# Patient Record
Sex: FEMALE | Race: WHITE
[De-identification: names, ages, dates, MRNs, and addresses within clinical notes are randomized per-mention and may not be internally consistent; named-entity substitution may affect disease eponyms.]

---

## 2017-11-27 ENCOUNTER — HOSPITAL ENCOUNTER (OUTPATIENT)
Dept: HOSPITAL 47 - RADMAMWWP | Age: 62
Discharge: HOME | End: 2017-11-27
Payer: COMMERCIAL

## 2017-11-27 DIAGNOSIS — M85.851: ICD-10-CM

## 2017-11-27 DIAGNOSIS — Z12.31: Primary | ICD-10-CM

## 2017-11-27 DIAGNOSIS — M85.852: ICD-10-CM

## 2017-11-27 PROCEDURE — 77080 DXA BONE DENSITY AXIAL: CPT

## 2017-11-27 NOTE — BD
EXAMINATION TYPE: MG DEXA axial skeleton.  

 

DATE OF EXAM: 2017

 

COMPARISON: 2014

 

CLINICAL HISTORY: 62-year-old female osteoporosis

 

Height:  61.2 IN

Weight:  155 LBS

 

FRAX RISK QUESTIONS:

Alcohol (3 or more units per day):  NO

Family History (Parent hip fracture):  NO

Glucocorticoids (More than 3mos):  NO

           (Ex: prednisone, prednisolone, methylprednisolone, dexamethasone, and hydrocortisone).    
     

History of Fracture in Adulthood: NO

Secondary Osteoporosis:

  1.  Type 1 Diabetes: NO

  2.  Hyperthyroidism: NO

  3.  Menopause before 45: NO AGE 49

  4.  Malnutrition: NO

  5.  Chronic liver disease: NO

Rheumatoid Arthritis: NO

Current Tobacco Use: NO

 

RISK FACTORS 

HISTORY OF: 

Active: YES

Postmenopausal woman: AGE 49

Take estrogen and/or progesterone medications: NOT NOW

How long: OFF AND ON AGE 16 - 29

 

MEDICATIONS: 

Thyroid Medications: YES 

Which medication: LEVOXYL

How Lon + YEARS

Additional Medications: VIT D, LEVOXYL, MOTRIN, NIACIN 

 

 

 

EXAM MEASUREMENTS: 

Bone mineral densitometry was performed using the [employer] System.

Bone mineral density as measured about the Lumbar spine is:  

----- L1-L4(G/cm2): 1.134

T Score Values are as follows:

----- L2: 0.2

----- L3: -0.9

----- L4: -0.3

----- L1-L4: -0.4

Bone mineral density has: Increased 2.6% since study of: 2014

 

Bone mineral density about the R hip (g/cm2): 0.815

Bone mineral density about the L hip (g/cm2): 0.832

T Score values are as follows:

-----R Neck: -1.6

-----L Neck: -1.5

-----R Total: -0.6

-----L Total: -1.1

Bone mineral density has: Decreased -0.8% since study of: 2014

 

 

IMPRESSION:

Osteopenia (T Score between -2.5 and -1 as noted by T score values

There is slightly increased risk of fracture and the patient may be considered 

for treatment. Re-Screen 2-5 years.

 

 

 

 

 

NOTE:  T-SCORE=SD OF THE YOUNG ADULT MEAN.

## 2017-11-29 NOTE — MM
Reason for exam: screening  (asymptomatic).

Last mammogram was performed 2 years and 3 months ago.



History:

Patient is postmenopausal and has history of other cancer at age 39.

Cyst aspiration of the left breast, 2004.

Took hormonal contraceptives for 12 years beginning at age 15.



Physical Findings:

A clinical breast exam by your physician is recommended on an annual basis and 

results should be correlated with mammographic findings.



MG Screening Mammo w CAD

Bilateral CC and MLO view(s) were taken.

Prior study comparison: September 4, 2015, bilateral MG screening mammo w CAD.  

April 30, 2014, bilateral digital screening mammo w/CAD.

The breast tissue is heterogeneously dense. This may lower the sensitivity of 

mammography.  No significant changes when compared with prior studies.





ASSESSMENT: Negative, BI-RAD 1



RECOMMENDATION:

Routine screening mammogram of both breasts in 1 year.

## 2019-12-05 ENCOUNTER — HOSPITAL ENCOUNTER (OUTPATIENT)
Dept: HOSPITAL 47 - RADNMMAIN | Age: 64
End: 2019-12-05
Attending: INTERNAL MEDICINE
Payer: COMMERCIAL

## 2019-12-05 DIAGNOSIS — I20.9: Primary | ICD-10-CM

## 2019-12-05 PROCEDURE — 93351 STRESS TTE COMPLETE: CPT

## 2019-12-05 NOTE — ECHOS
STRESS ECHOCARDIOGRAM



INDICATIONS:

Chest pain.



BASELINE HEART RATE:

69.



BASELINE. BLOOD PRESSURE:

132/74



MAXIMUM HEART RATE:

137



MAXIMUM BLOOD PRESSURE:

186/87



85% MPHR:

132



100% MPHR:

155



METS:

11.7



MAXIMUM STAGE REACHED:

4



TOTAL EXERCISE TIME:

10:01



CLINICAL INFORMATION:

Baseline EKG shows sinus rhythm, normal axis, normal intervals.  Patient exercised on

Guilherme protocol for a total of 10 minutes achieving 11 METS, 85% of predicted maximum

heart rate without chest pain. At peak exercise, occasional PVCs are noted.



Baseline echo shows normal left ventricular size. wall motion, systolic function.

Postexercise, there is normal hyperdynamic response of all segments of myocardium

noted.



CONCLUSION:

1. Excellent exercise tolerance.

2. Negative stress test by EKG criteria.

3. Negative stress echo.





MMODL / IJN: [id number] / Job#: [id number]

## 2020-01-09 ENCOUNTER — HOSPITAL ENCOUNTER (OUTPATIENT)
Dept: HOSPITAL 47 - RADMAMWWP | Age: 65
Discharge: HOME | End: 2020-01-09
Attending: INTERNAL MEDICINE
Payer: MEDICARE

## 2020-01-09 DIAGNOSIS — M85.80: ICD-10-CM

## 2020-01-09 DIAGNOSIS — Z12.31: Primary | ICD-10-CM

## 2020-01-09 PROCEDURE — 77080 DXA BONE DENSITY AXIAL: CPT

## 2020-01-09 PROCEDURE — 77063 BREAST TOMOSYNTHESIS BI: CPT

## 2020-01-09 PROCEDURE — 77067 SCR MAMMO BI INCL CAD: CPT

## 2020-01-09 NOTE — BD
EXAMINATION TYPE: Axial Bone Density

 

DATE OF EXAM: 1/9/2020

 

COMPARISON: 11.27.2017

 

CLINICAL HISTORY: 64 YR OLD FEMALE....ICD-10 CODE:     M81.0 KNOWN OSTEOPOROSIS

 

 

Height:  60.3

Weight:  152

 

FRAX RISK QUESTIONS:

NOTHING TO NOTE HERE

 

RISK FACTORS 

HISTORY OF: 

Active: YES

Postmenopausal woman: YES, AT 51 YRS OLD, NO HORMONES

Hyperparathyroidism: NO

Adrenal Insufficiency: NO

 

MEDICATIONS: 

Thyroid Medications:  YES, SYNTHROID FOR ABOUT 25 YRS

Additional Medications: VIT D, STATIN FOR CHOLESTEROL, XANAX, REFLUX MEDS    

Additional History: CHOLESTEROL, REFLUX

 

 

EXAM MEASUREMENTS: 

Bone mineral densitometry was performed using the [employer] System.

Bone mineral density as measured about the Lumbar spine is:  

----- L1-L4(G/cm2): 1.208

T Score Values are as follows:

----- L1:  0.0

----- L2:  0.7

----- L3:  -0.2

----- L4:  0.3

----- L1-L4:  0.2

Bone mineral density has: Increased 6.1% since study of: 11.27.2017

 

Bone mineral density about the R hip (g/cm2): 0.915

Bone mineral density about the L hip (g/cm2):  0.882

T Score values are as follows:

-----R Neck:  -1.7

-----L Neck:  -1.7

-----R Total:  -0.7

-----L Total:  -1.0

Bone mineral density has: Decreased -0.1% since study of: 11.27.2017

 

FRAX%s:   THERE IS A 9.4% CHANCE FOR A MAJOR OSTEOPOROTIC FX AND A 1.1% FOR HIP.....PROBABILITY FOR F
X IN 10 YRS TIME

 

IMPRESSION:

Osteopenia (T Score between -2.5 and -1).

 

There is slightly increased risk of fracture and the patient may be considered 

for treatment. 

 

Re-Screen 2-5 years.

 

 

 

 

 

NOTE:  T-SCORE=SD OF THE YOUNG ADULT MEAN.

## 2020-01-10 NOTE — MM
Reason for exam: screening  (asymptomatic).

Last mammogram was performed 2 years and 1 month ago.



History:

Patient is postmenopausal and has history of other cancer at age 39.

Cyst aspiration of the left breast, 2004.

Took hormonal contraceptives for 12 years beginning at age 15.



Physical Findings:

A clinical breast exam by your physician is recommended on an annual basis and 

results should be correlated with mammographic findings.



MG 3D Screening Mammo W/Cad

Bilateral CC and MLO view(s) were taken.

Prior study comparison: November 27, 2017, bilateral MG screening mammo w CAD.  

September 4, 2015, bilateral MG screening mammo w CAD.

There are scattered fibroglandular densities.  There are benign appearing round 

calcifications bilaterally. There is no discrete abnormality.





ASSESSMENT: Benign, BI-RAD 2



RECOMMENDATION:

Routine screening mammogram of both breasts in 1 year.

## 2020-01-28 ENCOUNTER — HOSPITAL ENCOUNTER (OUTPATIENT)
Dept: HOSPITAL 47 - WWCWWP | Age: 65
Discharge: HOME | End: 2020-01-28
Attending: OBSTETRICS & GYNECOLOGY
Payer: MEDICARE

## 2020-01-28 VITALS
RESPIRATION RATE: 18 BRPM | SYSTOLIC BLOOD PRESSURE: 130 MMHG | TEMPERATURE: 98.1 F | HEART RATE: 62 BPM | DIASTOLIC BLOOD PRESSURE: 89 MMHG

## 2020-01-28 DIAGNOSIS — Z53.9: Primary | ICD-10-CM

## 2020-01-28 NOTE — P.HPOB
History of Present Illness


H&P Date: 20


Chief Complaint: The patient is here for her routine gynecologic exam.


This is a 64-year-old  012 with an LMP of .  The patient is here to 

establish with this office.  Her last pelvic exam was about 2-3 years ago.  She 

is without gynecologic complaints and denies any postmenopausal bleeding.  She 

has occasional hot flashes still that are not bothersome.








Review of Systems


Weight has been stable. She denies respiratory, cardiac and G.I. problems.  She 

denies maltreatment or problems with falling.  : she denies any significant 

problems with urinary leakage.








Past Medical History


Past Medical History: Cancer, GERD/Reflux, Hyperlipidemia, Thyroid Disorder


Additional Past Medical History / Comment(s): Basal cell skin cancer.  

Osteopenia. PAST GYN HISTORY: She has no history of STDs.


History of Any Multi-Drug Resistant Organisms: None Reported


Past Surgical History: Breast Surgery, Uterine Ablation


Additional Past Surgical History / Comment(s): Partial thyroidectomy, iliac 

stent, removal of skin cancer, left breast cyst removal, and colonoscopy (last 

, next after 5yr).


Past Psychological History: Anxiety


Smoking Status: Former smoker


Past Alcohol Use History: Daily (1 per day)


Additional Past Alcohol Use History / Comment(s): Smoked between age 15 and 23.


Past Drug Use History: None Reported


Additional History: She has been  since  and is infrequently sexually

active due to her 's health issues.  She is a retired dental hygienist.





- Past Family History


  ** Mother


Family Medical History: Cancer


Additional Family Medical History / Comment(s): Colon cancer and melanoma skin 

cancer.  Maternal grandfather had colon cancer.





  ** Father


Family Medical History: Cancer, Hypertension


Additional Family Medical History / Comment(s): Lymphoma and leukemia.





Medications and Allergies


                                Home Medications











 Medication  Instructions  Recorded  Confirmed  Type


 


ALPRAZolam [Xanax] 1 tab PO DAILY PRN 20 History


 


Aspirin 81 mg PO DAILY 20 History


 


Atorvastatin [Lipitor] 10 mg PO HS 20 History


 


Ergocalciferol [Vitamin D2] 50,000 unit PO Q7D 20 History


 


Famotidine [Pepcid] 20 mg PO HS 20 History


 


Ibuprofen [Motrin] 600 mg PO Q8HR PRN 20 History


 


Levothyroxine Sodium [Synthroid] 125 mcg PO DAILY 20 History








                                    Allergies











Allergy/AdvReac Type Severity Reaction Status Date / Time


 


sulfur dioxide Allergy  Rash/Hives Unverified 20 13:30














Exam


                                   Vital Signs











  Temp Pulse Resp BP Pulse Ox


 


 20 13:32  98.1 F  62  18  130/89  97








                                Intake and Output











 20





 22:59 06:59 14:59


 


Other:   


 


  Weight   69.853 kg











Height 5 feet 1 inch, weight 154 pounds, BMI 29.1.





This is a well-developed well-nourished white female who is alert and oriented 

times 3 in no acute distress.


HEENT: Within normal limits.


NECK: Supple without mass or thyromegaly.


CHEST AND LUNGS: Clear to auscultation.


HEART: Regular rate and rhythm.


BREASTS: Are without mass or discharge.


AXILLARY EXAM: Negative for adenopathy.


BACK: Negative for CVA tenderness.


ABDOMEN: Soft, nontender, without palpable masses.


PELVIC EXAM: Normal external genitalia with mild to moderate atrophy. Cervix and

 vagina appear normal with mild atrophy. There is no unusual discharge.  There 

is no evidence of prolapse.  The uterus is midposition, nongravid size and 

nontender. There are no palpable adnexal masses or tenderness.


RECTAL EXAM: Rectovaginal exam is negative for mass or tenderness and is 

negative for occult blood.


EXTREMITIES: Nontender.





IMPRESSION: 


1.  64-year-old menopausal female with normal gynecologic exam.


2.  History of osteopenia.





PLAN: 


1.  Pap smear was performed.  We will try to obtain previous Pap smear records 

from Dr. Johnson's office.  If we can demonstrate she has had adequate screening 

we will consider discontinuing Pap smears.  She states she has never had 

cervical problems.


2.  Self breast awareness was discussed with the patient.


3.  Screening mammogram was recently done on 2020 and was benign.  This 

will be repeated in 1 year.


4.  Osteoporosis prevention was discussed.  I have stressed the importance of 

adequate calcium, vitamin D and regular exercise.  Recommended amounts of 

calcium and vitamin D were also discussed.  Bone density test done on 2020

 showed osteopenia of the hips.  I have recommended that she repeat this in 2-3 

years.


5.  She does get flu shots in the fall.


6. The patient was advised to return in 1-2 years for her well woman 

examination.

## 2020-02-11 NOTE — P.PN
Progress Note - Text


Progress Note Date: 02/11/20


OUTPATIENT FOLLOW-UP NOTE





TEST(S)/RESULTS: Pap smear from 01/28/2020 was negative.


METHOD OF NOTIFICATION: The patient was notified by phone.


PATIENT COMMENTS: 


DIAGNOSIS: If Pap smear


DISCUSSION: We are waiting on Pap smear reports from Dr. Johnson's office.  If we 

can demonstrate adequate screening with no cervical problems, we can consider 

discontinuing Pap smear testing


PLAN:  The patient was advised to return in 1-2 years for her well woman 

examination.

## 2021-01-20 ENCOUNTER — HOSPITAL ENCOUNTER (OUTPATIENT)
Dept: HOSPITAL 47 - RADUSWWP | Age: 66
Discharge: HOME | End: 2021-01-20
Attending: INTERNAL MEDICINE
Payer: MEDICARE

## 2021-01-20 DIAGNOSIS — N20.0: Primary | ICD-10-CM

## 2021-01-20 PROCEDURE — 76770 US EXAM ABDO BACK WALL COMP: CPT

## 2021-01-20 NOTE — US
EXAMINATION TYPE: US kidneys/renal and bladder

 

DATE OF EXAM: 1/20/2021

 

COMPARISON: NONE

 

CLINICAL HISTORY: N20.0 Calculus of kidney. Pain

 

EXAM MEASUREMENTS:

 

Right Kidney:  10.2 x 4.3 x 3.6 cm

Left Kidney: 10.4 x 5.4 x 4.2 cm

 

 

 

 

Right Kidney: No hydronephrosis or masses seen  

Left Kidney: Lobulated upper pole.  

Bladder: wnl

**Bilateral Jets seen: Yes

 

IMPRESSION:

 

1. Normal renal ultrasound

## 2021-04-21 ENCOUNTER — HOSPITAL ENCOUNTER (OUTPATIENT)
Dept: HOSPITAL 47 - WWCWWP | Age: 66
End: 2021-04-21
Attending: OBSTETRICS & GYNECOLOGY
Payer: MEDICARE

## 2021-04-21 VITALS
HEART RATE: 72 BPM | SYSTOLIC BLOOD PRESSURE: 125 MMHG | TEMPERATURE: 98 F | RESPIRATION RATE: 16 BRPM | DIASTOLIC BLOOD PRESSURE: 87 MMHG

## 2021-04-21 DIAGNOSIS — Z12.31: Primary | ICD-10-CM

## 2021-04-21 DIAGNOSIS — Z85.828: ICD-10-CM

## 2021-04-21 DIAGNOSIS — Z87.39: ICD-10-CM

## 2021-04-21 DIAGNOSIS — Z79.82: ICD-10-CM

## 2021-04-21 DIAGNOSIS — E78.5: ICD-10-CM

## 2021-04-21 DIAGNOSIS — F41.9: ICD-10-CM

## 2021-04-21 DIAGNOSIS — Z87.891: ICD-10-CM

## 2021-04-21 DIAGNOSIS — N81.6: ICD-10-CM

## 2021-04-21 PROCEDURE — 77067 SCR MAMMO BI INCL CAD: CPT

## 2021-04-21 PROCEDURE — 77063 BREAST TOMOSYNTHESIS BI: CPT

## 2021-04-21 NOTE — P.HPOB
History of Present Illness


H&P Date: 21


Chief Complaint: The patient is here for her routine gynecologic exam and ma

mmogram.


This is a 65-year-old  012 with an LMP of .  The patient noticed a 

slight bulge coming from the vaginal opening about 2 weeks ago.  She was 

wondering if it was her bladder that was bulging.  The bulge felt somewhat firm 

and came to the opening.  She has noticed a slightly slower's urinary stream 

compared to the past.  She is otherwise without complaints.





Review of Systems


The patient has gained 4 pounds over the last year.  She denies respiratory or 

cardiac problems.  GI: Occasional constipation and occasional gastroesophageal 

reflux symptoms.








Past Medical History


Past Medical History: Cancer, GERD/Reflux, Hyperlipidemia, Thyroid Disorder


Additional Past Medical History / Comment(s): Basal cell skin cancer.  

Osteopenia. PAST GYN HISTORY: She has no history of STDs.


History of Any Multi-Drug Resistant Organisms: None Reported


Past Surgical History: Breast Surgery, Uterine Ablation


Additional Past Surgical History / Comment(s): Partial thyroidectomy, iliac 

stent, removal of skin cancer, left breast cyst removal, and colonoscopy (last 

, next after 5yr).


Past Psychological History: Anxiety


Smoking Status: Former smoker


Past Alcohol Use History: Daily (1-2 daily)


Additional Past Alcohol Use History / Comment(s): Smoked between age 15 and 23.


Past Drug Use History: None Reported


Additional History: She has been  since .  She is a retired dental 

hygienist.  She now has 2 grandchildren who live out of state.





- Past Family History


  ** Mother


Family Medical History: Cancer


Additional Family Medical History / Comment(s): Colon cancer and melanoma skin 

cancer.  Maternal grandfather had colon cancer.





  ** Father


Family Medical History: Cancer, Hypertension


Additional Family Medical History / Comment(s): Lymphoma and leukemia.





Medications and Allergies


                                Home Medications











 Medication  Instructions  Recorded  Confirmed  Type


 


ALPRAZolam [Xanax] 1 tab PO DAILY PRN 20 History


 


Aspirin 81 mg PO DAILY 20 History


 


Ergocalciferol [Vitamin D2] 50,000 unit PO Q7D 20 History


 


Famotidine [Pepcid] 20 mg PO HS 20 History


 


Ibuprofen [Motrin] 600 mg PO Q8HR PRN 20 History


 


Levothyroxine Sodium [Synthroid] 125 mcg PO DAILY 20 History


 


Rosuvastatin [Crestor] 10 mg PO HS 21 History








                                    Allergies











Allergy/AdvReac Type Severity Reaction Status Date / Time


 


sulfur dioxide Allergy  Rash/Hives Unverified 21 09:27














Exam


                                   Vital Signs











  Temp Pulse Resp BP Pulse Ox


 


 21 09:30  98.0 F  72  16  125/87  98








                                Intake and Output











 21





 22:59 06:59 14:59


 


Other:   


 


  Weight   71.668 kg











Height 5 feet 1 inch, weight 158 pounds, BMI 29.9.





This is a well-developed well-nourished white female who is alert and oriented 

times 3 in no acute distress.


HEENT: Within normal limits.


NECK: Supple without mass or thyromegaly.


CHEST AND LUNGS: Clear to auscultation.


HEART: Regular rate and rhythm.


BREASTS: Are without mass or discharge.


AXILLARY EXAM: Negative for adenopathy.


BACK: Negative for CVA tenderness.


ABDOMEN: Soft, nontender, without palpable masses.


PELVIC EXAM: Normal external genitalia with mild atrophy. Cervix and vagina 

appear normal mild atrophy. There is no unusual discharge.  There is a grade 2 

rectocele noted.  There is no significant cystocele or uterine prolapse.  The 

uterus is midposition, nongravid size and nontender. There are no palpable 

adnexal masses or tenderness.


RECTAL EXAM: Rectovaginal exam is negative for mass or tenderness and is 

negative for occult blood.  Rectovaginal exam does confirm a small rectocele.


EXTREMITIES: Nontender.





IMPRESSION: 


1.  65-year-old menopausal female with grade 2 rectocele.


2.  History of osteopenia





PLAN: 


1.  Pap smears have been discontinued.


2.  Self breast awareness was discussed with the patient.


3.  Screening mammogram will be done today.


4.  We had a long discussion regarding her rectocele.  She states the day she 

noticed it, she was somewhat constipated.  I have advised to that she avoid 

holding her stool longer than necessary.  We will proceed with conservative 

management at this time.  She will call if she is having greater problems.  The 

ACOG FAQ handout on pelvic relaxation was given to the patient.  We have 

discussed the option of surgical correction if she is having greater problems.


5.  Osteoporosis prevention was discussed.  I have stressed the importance of 

adequate calcium, vitamin D and regular exercise.  Recommended amounts of 

calcium and vitamin D were also discussed.  We will plan on repeating the bone 

density test in 1-2 years.  Her last one was done in .


6. The patient was advised to return in 1-2 years for her well woman 

examination.

## 2021-04-22 NOTE — MM
Reason for exam: screening  (asymptomatic).

Last mammogram was performed 1 year and 3 months ago.



History:

Patient is postmenopausal and has history of other cancer at age 39.

Cyst aspiration of the left breast, 2004.

Took hormonal contraceptives for 12 years beginning at age 15.



Physical Findings:

A clinical breast exam by your physician is recommended on an annual basis and 

results should be correlated with mammographic findings.



MG 3D Screening Mammo W/Cad

Bilateral CC and MLO view(s) were taken.  XCCL view(s) were taken of the left 

breast.

Prior study comparison: January 9, 2020, bilateral MG 3d screening mammo w/cad.  

November 27, 2017, bilateral MG screening mammo w CAD.

There are scattered fibroglandular densities.  There are benign appearing round 

calcifications bilaterally. There is no discrete abnormality.





ASSESSMENT: Benign, BI-RAD 2



RECOMMENDATION:

Routine screening mammogram of both breasts in 1 year.

## 2022-05-03 ENCOUNTER — HOSPITAL ENCOUNTER (OUTPATIENT)
Dept: HOSPITAL 47 - WWCWWP | Age: 67
End: 2022-05-03
Attending: OBSTETRICS & GYNECOLOGY
Payer: MEDICARE

## 2022-05-03 VITALS
SYSTOLIC BLOOD PRESSURE: 125 MMHG | RESPIRATION RATE: 17 BRPM | TEMPERATURE: 97.9 F | HEART RATE: 71 BPM | DIASTOLIC BLOOD PRESSURE: 82 MMHG

## 2022-05-03 DIAGNOSIS — Z01.419: Primary | ICD-10-CM

## 2022-05-03 DIAGNOSIS — Z87.39: ICD-10-CM

## 2022-05-03 DIAGNOSIS — Z78.0: ICD-10-CM

## 2022-05-03 DIAGNOSIS — Z88.8: ICD-10-CM

## 2022-05-03 DIAGNOSIS — Z87.891: ICD-10-CM

## 2022-05-03 DIAGNOSIS — E78.5: ICD-10-CM

## 2022-05-03 DIAGNOSIS — F41.9: ICD-10-CM

## 2022-05-03 NOTE — P.HPOB
History of Present Illness


H&P Date: 22


Chief Complaint: The patient is here for her routine gynecologic exam.





This is a 66-year-old  012 with an LMP of .  The patient is without 

gynecologic complaints.  She denies any problems from her known rectocele.





Review of Systems





Weight has been stable over the past year.  Denies respiratory or cardiac 

problems.  GI: Occasional cramping when she eats beef.





Past Medical History


Past Medical History: Cancer, GERD/Reflux, Hyperlipidemia, Thyroid Disorder


Additional Past Medical History / Comment(s): Basal cell skin cancer.  

Osteopenia. PAST GYN HISTORY: She has no history of STDs.


History of Any Multi-Drug Resistant Organisms: None Reported


Past Surgical History: Breast Surgery, Uterine Ablation


Additional Past Surgical History / Comment(s): Partial thyroidectomy, iliac 

stent, removal of skin cancer, left breast cyst removal, and colonoscopy (last 

, next after 5yr).


Past Psychological History: Anxiety


Smoking Status: Former smoker


Past Alcohol Use History: Daily (0-2 drinks per day)


Additional Past Alcohol Use History / Comment(s): Smoked between age 15 and 23.


Past Drug Use History: Marijuana


Additional Drug Use History / Comment(s): Marijuana as a teenager only.  No 

other drug use.


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 

hygienist.  She has 3 grandchildren who live out of state.





- Past Family History


  ** Mother


Family Medical History: Cancer


Additional Family Medical History / Comment(s): Colon cancer and melanoma skin 

cancer.  Maternal grandfather had colon cancer.





  ** Father


Family Medical History: Cancer, Hypertension


Additional Family Medical History / Comment(s): Lymphoma and leukemia.





Medications and Allergies


                                Home Medications











 Medication  Instructions  Recorded  Confirmed  Type


 


ALPRAZolam [Xanax] 1 tab PO DAILY PRN 20 History


 


Aspirin 81 mg PO DAILY 20 History


 


Ergocalciferol [Vitamin D2] 50,000 unit PO Q7D 20 History


 


Famotidine [Pepcid] 20 mg PO HS 20 History


 


Ibuprofen [Motrin] 600 mg PO Q8HR PRN 20 History


 


Levothyroxine Sodium [Synthroid] 125 mcg PO DAILY 20 History


 


Rosuvastatin [Crestor] 10 mg PO HS 21 History


 


Fexofenadine HCl [Allegra Allergy] 60 mg PO DAILY 22 History


 


Ubidecarenone [Co Q-10] 20 mg PO DAILY 22 History








                                    Allergies











Allergy/AdvReac Type Severity Reaction Status Date / Time


 


sulfur dioxide Allergy  Rash/Hives Unverified 22 14:45














Exam


                                   Vital Signs











  Temp Pulse Resp BP Pulse Ox


 


 22 14:48  97.9 F  71  17  125/82  97








                                Intake and Output











 22





 06:59 14:59 22:59


 


Other:   


 


  Weight  70.76 kg 














Height 5 foot 1 inch, weight 156 pounds, BMI 29.5.





This is a well-developed well-nourished white female who is alert and oriented 

times 3 in no acute distress.


HEENT: Within normal limits.


NECK: Supple without mass or thyromegaly.


CHEST AND LUNGS: Clear to auscultation.


HEART: Regular rate and rhythm.


BREASTS: Are without mass or discharge.


AXILLARY EXAM: Negative for adenopathy.


BACK: Negative for CVA tenderness.


ABDOMEN: Soft, nontender, without palpable masses.


PELVIC EXAM: Normal external genitalia with mild to moderate atrophy. Cervix and

vagina appear normal with mild to moderate atrophy. There is no unusual 

discharge.  There is a grade 1-2 rectocele.  The uterus is midposition, 

nongravid size and nontender. There are no palpable adnexal masses or 

tenderness.


RECTAL EXAM: Rectovaginal exam is negative for mass or tenderness and is 

negative for occult blood.  Rectal exam confirms a small rectocele.


EXTREMITIES: Nontender.





IMPRESSION: 


1.  66-year-old menopausal female with stable grade 1-2 rectocele.


2.  History of osteopenia.





PLAN: 


1.  Pap smears have been discontinued.


2.  Self breast awareness was discussed with the patient.  We have also 

discussed symptoms associated with inflammatory breast cancer.


3.  Screening mammogram is scheduled for 2022.  The patient has an order 

slip for this from her PCP.


4.  Osteoporosis prevention was discussed.  I have stressed the importance of 

adequate calcium, vitamin D and regular exercise.  Recommended amounts of 

calcium and vitamin D were also discussed.  She has a bone density test is 

scheduled for 2022.  The order slip was given to the patient for this.


5.  She is scheduled for a colonoscopy on 2022.


6.  She has completed her Covid vaccination series and did receive a booster.


7. She was advised to return in one year for her annual well woman exam.

## 2022-05-31 ENCOUNTER — HOSPITAL ENCOUNTER (OUTPATIENT)
Dept: HOSPITAL 47 - RADMAMWWP | Age: 67
Discharge: HOME | End: 2022-05-31
Attending: INTERNAL MEDICINE
Payer: MEDICARE

## 2022-05-31 DIAGNOSIS — M85.89: ICD-10-CM

## 2022-05-31 DIAGNOSIS — Z12.31: Primary | ICD-10-CM

## 2022-05-31 PROCEDURE — 77067 SCR MAMMO BI INCL CAD: CPT

## 2022-05-31 PROCEDURE — 77063 BREAST TOMOSYNTHESIS BI: CPT

## 2022-05-31 PROCEDURE — 77080 DXA BONE DENSITY AXIAL: CPT

## 2022-05-31 NOTE — BD
EXAMINATION TYPE: Axial Bone Density

 

DATE OF EXAM: 5/31/2022

 

 

CLINICAL HISTORY: 66 years year old Female.  ICD-10 CODE: M81.0 AGE-RELATED OSTEOPOROSIS W/O CURRENT

 

Height:  5 FT

Weight:  152

 

FRAX RISK QUESTIONS:

Alcohol (3 or more units per day):  NO

Family History (Parent hip fracture):  NO

Glucocorticoids (More than 3mos):  NO

           (Ex: prednisone, prednisolone, methylprednisolone, dexamethasone, and hydrocortisone).    
     

History of Fracture in Adulthood: NO

Secondary Osteoporosis:

  1.  Type 1 Diabetes: NO

  2.  Hyperthyroidism: NO

  3.  Menopause before 45: NO

  4.  Malnutrition: NO

  5.  Chronic liver disease: NO

Rheumatoid Arthritis: NO

Current Tobacco Use: NO

 

RISK FACTORS 

HISTORY OF: 

Surgery to Spine/Hip(right/left)/Wrist (right/left): NO

Family History of Osteoporosis: NO

Active: YES

Diet low in dairy products/other sources of calcium:  NO

Postmenopausal woman: YES

Take estrogen and/or progesterone medications: NO

Lost more than 2 inches in height since high school: YES

Frequent falls: NO

Poor Health: GOOD

Hyperparathyroidism: NO

Adrenal Insufficiency: NO

 

MEDICATIONS: 

Thyroid Medications:  YES

Which medication: SYNTHROID

How Long: APPROX 30 YEARS

  

Additional Medications: SYNTHROID, STATIN, XANAX AS NEEDED, OMEPRAZOLE, ALLEGRA, ASPIRIN,  

 

 

Additional History:

 

 

EXAM MEASUREMENTS: 

Bone mineral densitometry was performed using the [employer] System.

Bone mineral density as measured about the Lumbar spine is:  

----- L1-L4(G/cm2): 1.160

T Score Values are as follows:

----- L1: -0.2

----- L2: 0.6

----- L3: -0.8

----- L4: -0.2

----- L1-L4: -0.2

Bone mineral density has: DECREASED  -4.3 % since study of: 2020

 

Bone mineral density about the R hip (g/cm2): 0.763

Bone mineral density about the L hip (g/cm2): 0.791

T Score values are as follows:

-----R Neck: -2.0

-----L Neck: -1.8

-----R Total: -0.8

-----L Total: -1.3

Bone mineral density has: DECREASED  -2.7 % since study of: 2020

 

 

FRAX%s: The graph provided illustrates a 11.1 % chance for a major osteoporotic fx and a 1.8 % chance
 for the hips probability for fx in 10 years time.

 

IMPRESSION:

Osteopenia (T Score between -2.5 and -1).

 

There is slightly increased risk of fracture and the patient may be considered 

for treatment. 

 

Re-Screen 2-5 years.

 

NOTE:  T-SCORE=SD OF THE YOUNG ADULT MEAN.

## 2022-06-01 NOTE — P.PN
Progress Note - Text


Progress Note Date: 06/01/22





OUTPATIENT FOLLOW-UP NOTE





TEST(S)/RESULTS: Bone density test done on 05/31/2022 showed osteopenia


METHOD OF NOTIFICATION: A message with this result was left on the patient's 

voicemail.


PATIENT COMMENTS: 


DIAGNOSIS: Osteopenia


DISCUSSION: In the message I explained that there was a slight decrease in bone 

density from her 2020 test.  I have stressed the importance of adequate calcium,

vitamin D, and regular exercise.


PLAN: Repeat bone density testing in 2 years.

## 2022-06-01 NOTE — MM
Reason for Exam: Screening  (asymptomatic). 

Last mammogram was performed 1 year(s) and 1 month(s) ago. 





Patient History: 

Menarche at age 12. First Full-Term Pregnancy at age 27. Postmenopausal. Other cancer, age 39.

Hormonal Contraceptives for 12 years from age 15 until age 30. 2004, Cyst Aspiration on the Left

side. 





Risk Values: 

Monika 5 year model risk: 1.9%.

NCI Lifetime model risk: 6.4%.





Prior Study Comparison: 

11/27/2017 Bilateral Screening Mammogram, [facility]. 1/9/2020 Bilateral Screening Mammogram, [facility].

4/21/2021 Bilateral Screening Mammogram, [facility]. 





Tissue Density: 

There are scattered fibroglandular densities.





Findings: 

Analyzed By CAD. 

There is no suspicious group of microcalcifications or new suspicious mass in either breast. 





Overall Assessment: Benign, BI-RAD 2





Management: 

Screening Mammogram of both breasts in 1 year.

A clinical breast exam by your physician is recommended on an annual basis and results should be

correlated with mammographic findings.



Electronically signed and approved by: Rod Aguilar D.O. Radiologis

## 2023-06-06 ENCOUNTER — HOSPITAL ENCOUNTER (OUTPATIENT)
Dept: HOSPITAL 47 - WWCWWP | Age: 68
End: 2023-06-06
Attending: OBSTETRICS & GYNECOLOGY
Payer: MEDICARE

## 2023-06-06 VITALS
HEART RATE: 63 BPM | SYSTOLIC BLOOD PRESSURE: 115 MMHG | TEMPERATURE: 97.8 F | RESPIRATION RATE: 16 BRPM | DIASTOLIC BLOOD PRESSURE: 76 MMHG

## 2023-06-06 DIAGNOSIS — Z87.891: ICD-10-CM

## 2023-06-06 DIAGNOSIS — Z85.9: ICD-10-CM

## 2023-06-06 DIAGNOSIS — E78.5: ICD-10-CM

## 2023-06-06 DIAGNOSIS — E07.9: ICD-10-CM

## 2023-06-06 DIAGNOSIS — K21.9: ICD-10-CM

## 2023-06-06 DIAGNOSIS — Z79.890: ICD-10-CM

## 2023-06-06 DIAGNOSIS — Z80.8: ICD-10-CM

## 2023-06-06 DIAGNOSIS — Z01.419: Primary | ICD-10-CM

## 2023-06-06 DIAGNOSIS — J45.909: ICD-10-CM

## 2023-06-06 DIAGNOSIS — Z78.0: ICD-10-CM

## 2023-06-06 DIAGNOSIS — Z88.2: ICD-10-CM

## 2023-06-06 PROCEDURE — 77067 SCR MAMMO BI INCL CAD: CPT

## 2023-06-06 PROCEDURE — 77063 BREAST TOMOSYNTHESIS BI: CPT

## 2023-06-06 NOTE — P.HPOB
History of Present Illness


H&P Date: 23


Chief Complaint: The patient is here for her routine gynecologic exam and ma

mmogram.





This is a 67-year-old  012 with an LMP of .  The patient is without 

gynecologic complaints.  She has a known rectocele, but this has not been 

causing any significant problems.





Review of Systems





The patient has lost 4 pounds over the last year.  She denies respiratory, 

cardiac, or G.I. problems.





Past Medical History


Past Medical History: Asthma, Cancer, GERD/Reflux, Hyperlipidemia, Thyroid 

Disorder


Additional Past Medical History / Comment(s): Basal cell skin cancer.  

GERD/exercise induced asthma. Osteopenia. PAST GYN HISTORY: She has no history 

of STDs.


History of Any Multi-Drug Resistant Organisms: None Reported


Past Surgical History: Breast Surgery, Uterine Ablation


Additional Past Surgical History / Comment(s): Partial thyroidectomy, iliac 

stent, removal of skin cancer, left breast cyst removal, and colonoscopy (last 

, next after 5yr).


Past Psychological History: Anxiety


Smoking Status: Former smoker


Past Alcohol Use History: Occasional (0-2 per day.)


Additional Past Alcohol Use History / Comment(s): Smoked between age 15 and 23.


Past Drug Use History: Marijuana


Additional Drug Use History / Comment(s): Marijuana as a teenager only.  No 

other drug use.


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 

hygienist.  She has 3 grandchildren who live out of state.





- Past Family History


  ** Mother


Family Medical History: Cancer


Additional Family Medical History / Comment(s): Colon cancer and melanoma skin 

cancer.  Maternal grandfather had colon cancer.





  ** Father


Family Medical History: Cancer, Hypertension


Additional Family Medical History / Comment(s): Lymphoma and leukemia.





Medications and Allergies


                                Home Medications











 Medication  Instructions  Recorded  Confirmed  Type


 


ALPRAZolam [Xanax] 1 tab PO DAILY PRN 20 History


 


Aspirin 81 mg PO DAILY 20 History


 


Ergocalciferol [Vitamin D2] 50,000 unit PO Q7D 20 History


 


Ibuprofen [Motrin] 600 mg PO Q8HR PRN 20 History


 


Levothyroxine Sodium [Synthroid] 125 mcg PO DAILY 20 History


 


Rosuvastatin [Crestor] 10 mg PO HS 21 History


 


Fexofenadine HCl [Allegra Allergy] 60 mg PO DAILY 22 History


 


[place]/D3/Mag11/Zinc//Walter/Bor 1 tab PO DAILY 23 History





[Caltrate 600+D Plus Tablet]    


 


Omeprazole 20 mg PO DAILY 23 History








                                    Allergies











Allergy/AdvReac Type Severity Reaction Status Date / Time


 


sulfur dioxide Allergy  Rash/Hives Unverified 23 10:47














Exam


                                   Vital Signs











  Temp Pulse Resp BP Pulse Ox


 


 23 10:50  97.8 F  63  16  115/76  97








                                Intake and Output











 23





 22:59 06:59 14:59


 


Other:   


 


  Weight   68.946 kg














Height 5 foot 1 inch, weight 152 pounds, BMI 28.7.





This is a well-developed well-nourished white female who is alert and oriented 

times 3 in no acute distress.


HEENT: Within normal limits.


NECK: Supple without mass or thyromegaly.


CHEST AND LUNGS: Clear to auscultation.


HEART: Regular rate and rhythm.


BREASTS: Are without mass or discharge.


AXILLARY EXAM: Negative for adenopathy.


BACK: Negative for CVA tenderness.


ABDOMEN: Soft, nontender, without palpable masses.


PELVIC EXAM: Normal external genitalia with mild to moderate atrophy. Cervix and

vagina appear normal mild atrophy. There is no unusual discharge.  There is a 

stable grade 2 rectocele.  The uterus is midposition, nongravid size and 

nontender. There are no palpable adnexal masses or tenderness.


RECTAL EXAM: Vaginal exam is negative for mass or tenderness and is negative for

occult blood.  There is also confirms a small rectocele.


EXTREMITIES: Nontender.





IMPRESSION: 


1.  67-year-old menopausal female with stable grade 2 rectocele which is not 

causing any significant problems.


2.  History of osteopenia.  Her last bone density test was done on 2022.





PLAN: 


1.  Pap smears have been discontinued.


2.  Self breast awareness was discussed with the patient.  We have also 

discussed symptoms associated with inflammatory breast cancer.


3.  Screening mammogram will be done today.


4.  Osteoporosis prevention was discussed.  I have stressed the importance of 

adequate calcium, vitamin D and regular exercise.  Recommended amounts of 

calcium and vitamin D were also discussed.  We will plan on repeating the bone 

density test in 1 year.


5.   She was advised to return in one year for her annual well woman exam.

## 2023-06-07 NOTE — MM
Reason for Exam: Screening  (asymptomatic). 

Last mammogram was performed 1 year(s) and 1 month(s) ago. 





Patient History: 

Menarche at age 12. First Full-Term Pregnancy at age 27. Postmenopausal. Patient has history of

breast feeding. Hormonal Contraceptives for 12 years from age 15 until age 30. 2004, Cyst Aspiration

on the Left side. 





Risk Values: 

Monika 5 year model risk: 1.9%.

NCI Lifetime model risk: 6.2%.





Prior Study Comparison: 

1/9/2020 Bilateral Screening Mammogram, [facility]. 4/21/2021 Bilateral Screening Mammogram, [facility]. 5/31/2022

Bilateral MG 3D screening mammo w/cad, [facility]. 





Tissue Density: 

The breast tissue is heterogeneously dense. This may lower the sensitivity of mammography.





Findings: 

Analyzed By CAD. 

There is no suspicious group of microcalcifications or new suspicious mass in either breast.

Benign-appearing round calcifications within both breasts. 





Overall Assessment: Benign, BI-RAD 2





Management: 

Screening Mammogram of both breasts in 1 year.

A clinical breast exam by your physician is recommended on an annual basis and results should be

correlated with mammographic findings.



Electronically signed and approved by: Rashad Bernal D.O.

## 2024-06-18 ENCOUNTER — HOSPITAL ENCOUNTER (OUTPATIENT)
Dept: HOSPITAL 47 - WWCWWP | Age: 69
End: 2024-06-18
Attending: OBSTETRICS & GYNECOLOGY
Payer: MEDICARE

## 2024-06-18 VITALS
DIASTOLIC BLOOD PRESSURE: 86 MMHG | RESPIRATION RATE: 17 BRPM | HEART RATE: 81 BPM | SYSTOLIC BLOOD PRESSURE: 119 MMHG | TEMPERATURE: 98.4 F

## 2024-06-18 DIAGNOSIS — M85.80: ICD-10-CM

## 2024-06-18 DIAGNOSIS — Z88.2: ICD-10-CM

## 2024-06-18 DIAGNOSIS — N81.6: ICD-10-CM

## 2024-06-18 DIAGNOSIS — Z87.891: ICD-10-CM

## 2024-06-18 DIAGNOSIS — Z78.0: ICD-10-CM

## 2024-06-18 DIAGNOSIS — Z12.31: Primary | ICD-10-CM

## 2024-06-18 PROCEDURE — 77080 DXA BONE DENSITY AXIAL: CPT

## 2024-06-18 PROCEDURE — 77063 BREAST TOMOSYNTHESIS BI: CPT

## 2024-06-18 PROCEDURE — 77067 SCR MAMMO BI INCL CAD: CPT

## 2024-06-18 NOTE — P.HPOB
History of Present Illness


H&P Date: 24


Chief Complaint: The patient is here for her routine gynecologic exam and ma

mmogram.





This is a 68-year-old -0-1-2 with an LMP of .  The patient is without 

gynecologic complaints.  She wonders if the small rectocele that she has causes 

issues with passing gas unexpectedly.  She is otherwise without gynecologic 

complaints.





Review of Systems





The patient's weight has been stable over the last year.  She denies 

respiratory, cardiac, or G.I. problems.





Past Medical History


Past Medical History: Asthma, Cancer, GERD/Reflux, Hyperlipidemia, Thyroid 

Disorder


Additional Past Medical History / Comment(s): Basal cell skin cancer.  

GERD/exercise induced asthma. Osteopenia. PAST GYN HISTORY: She has no history 

of STDs.


History of Any Multi-Drug Resistant Organisms: None Reported


Past Surgical History: Breast Surgery, Uterine Ablation


Additional Past Surgical History / Comment(s): Partial thyroidectomy, iliac 

stent, removal of skin cancer, left breast cyst removal, and colonoscopy (last 

, next after 5yr).


Past Psychological History: Anxiety


Smoking Status: Former smoker


Past Alcohol Use History: Occasional (0-1 drink per day.)


Additional Past Alcohol Use History / Comment(s): Smoked between age 15 and 23.


Past Drug Use History: Marijuana


Additional Drug Use History / Comment(s): Marijuana as a teenager only.  No 

other drug use.


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 

hygienist.  She has 3 grandchildren who live out of state.





- Past Family History


  ** Mother


Family Medical History: Cancer


Additional Family Medical History / Comment(s): Colon cancer and melanoma skin 

cancer.  Maternal grandfather had colon cancer.





  ** Father


Family Medical History: Cancer, Hypertension


Additional Family Medical History / Comment(s): Lymphoma and leukemia.





Medications and Allergies


                                Home Medications











 Medication  Instructions  Recorded  Confirmed  Type


 


ALPRAZolam [Xanax] 1 tab PO DAILY PRN 20 History


 


Aspirin 81 mg PO DAILY 20 History


 


Ergocalciferol [Vitamin D2] 50,000 unit PO Q7D 20 History


 


Ibuprofen [Motrin] 600 mg PO Q8HR PRN 20 History


 


Levothyroxine Sodium [Synthroid] 125 mcg PO DAILY 20 History


 


Rosuvastatin [Crestor] 10 mg PO HS 21 History


 


Omeprazole 20 mg PO DAILY 23 History


 


Calcium Carbonate [Tums] 2 tab PO DAILY 24 History


 


Cetirizine HCl [Zyrtec] 10 mg PO DAILY 24 History


 


Cholecalciferol (Vitamin D3) 1 tab PO DAILY 24 History





[Vitamin D3 (1250 Mcg = 50,000 Iu)]    


 


Ubidecarenone [Co Q-10] 1 cap PO DAILY 24 History








                                    Allergies











Allergy/AdvReac Type Severity Reaction Status Date / Time


 


sulfur dioxide Allergy  Rash/Hives Unverified 24 09:38














Exam


                                   Vital Signs











  Temp Pulse Resp BP Pulse Ox


 


 24 09:41  98.4 F  81  17  119/86  97








                                Intake and Output











 24





 22:59 06:59 14:59


 


Other:   


 


  Weight   68.946 kg














Height 5 feet 0 inches, weight 152 pounds, BMI 29.7.





This is a well-developed well-nourished white female who is alert and oriented 

times 3 in no acute distress.


HEENT: Within normal limits.


NECK: Supple without mass or thyromegaly.


CHEST AND LUNGS: Clear to auscultation.


HEART: Regular rate and rhythm.


BREASTS: Are without mass or discharge.


AXILLARY EXAM: Negative for adenopathy.


BACK: Negative for CVA tenderness.


ABDOMEN: Soft, nontender, without palpable masses.


PELVIC EXAM: Normal external genitalia with mild atrophy. Cervix and vagina 

appear normal with mild to moderate atrophy. There is no unusual discharge.  

There is a stable grade 1-2 rectocele.  There is no other significant prolapse 

noted.  The uterus is midposition, nongravid size and nontender. There are no 

palpable adnexal masses or tenderness.


RECTAL EXAM: Rectovaginal exam is negative for mass or tenderness and is 

negative for occult blood.


EXTREMITIES: Nontender.





IMPRESSION: 


1.  68-year-old menopausal female with stable grade 2 rectocele.


2.  History of osteopenia.





PLAN: 


1.  Pap smears have been discontinued.


2.  Self breast awareness was discussed with the patient.  We have also 

discussed symptoms associated with inflammatory breast cancer.


3.  Screening mammogram will be done today.


4.  Osteoporosis prevention was discussed.  I have stressed the importance of 

adequate calcium, vitamin D and regular exercise.  Recommended amounts of 

calcium and vitamin D were also discussed.  Bone density testing will be done 

today.


5.  She was advised to return in one year for her annual well woman exam.

## 2024-06-19 NOTE — MM
Reason for Exam: Screening  (asymptomatic). 

Last screening mammogram was performed 12 month(s) ago.





Patient History: 

Menarche at age 12. First Full-Term Pregnancy at age 27. Postmenopausal. Patient has history of

breast feeding. Hormonal Contraceptives for 12 years from age 15 until age 30. 2004, Cyst Aspiration

on the Left side. 





Risk Values: 

Monika 5 year model risk: 1.9%.

NCI Lifetime model risk: 5.9%.





Prior Study Comparison: 

4/21/2021 Bilateral Screening Mammogram, [facility]. 5/31/2022 Bilateral MG 3D screening mammo w/cad, [facility].

6/6/2023 Bilateral MG 3D screening mammo w/cad, [facility]. 





Tissue Density: 

The breasts are heterogeneously dense, which may obscure small masses.





Findings: 

Analyzed By CAD. 

Asymmetric density outer left CC view 4.2 cm from the nipple. Additional views are recommended. No

suspicious calcifications within either breast. 





Overall Assessment: Incomplete: need additional imaging evaluation, BI-RAD 0





Management: 

Diagnostic Mammogram of the left breast.

.



Patient should continue monthly self-breast exams.  A clinical breast exam by your physician is

recommended on an annual basis.

This exam should not preclude additional follow-up of suspicious palpable abnormalities.



Note on Monika scores and lifetime risk:

1. A Monika score greater than 3% is considered moderate risk. If this is the case, consider

specialist referral to assess eligibility for a risk reducing agent.

2. If overall lifetime risk for the development of breast cancer is 20% or higher, the patient may

qualify for future screening with alternating mammogram and breast MRI.



Electronically signed and approved by: Leopold M. Fregoli, M.D. Radiologis

## 2024-06-19 NOTE — BD
EXAMINATION TYPE: Axial Bone Density

 

DATE OF EXAM: 2024

 

CLINICAL HISTORY: 68 years old Female.  ICD-10 CODE: Z780 POST RADAMES WITHOUT HRT

 

Height:  60.5 in

Weight:  151 lbs

 

 

 

MEDICATIONS: 

Thyroid Medications:  yes

Which medication: Synthroid

How Lon+ years

 

EXAM MEASUREMENTS: 

Bone mineral densitometry was performed using the [employer] System.

Bone mineral density as measured about the Lumbar spine is:  

----- L1-L4(G/cm2): 1.141

T Score Values are as follows:

----- L1: -0.4

----- L2: 0.1

----- L3: -0.6

----- L4: -0.4

----- L1-L4: -0.3

 

Z Score Values are as follows:

----- L1: 1.1

----- L2: 1.6

----- L3: 1.0

----- L4: 1.1

----- L1-L4: 1.2

 

Bone mineral density has: Decreased -1.6% since study of: 2022

 

Bone mineral density about the R hip (g/cm2): 0.879

Bone mineral density about the L hip (g/cm2): 0.830

T Score values are as follows:

-----R Neck: -2.4

-----L Neck: -2.1

-----R Total: -1.0

-----L Total: -1.4

 

Z Score values are as follows:

-----R Neck: -0.8

-----L Neck: -0.5

-----R Total: 0.3

-----L Total: -0.1

 

Bone mineral density has: Decreased -2.3% since study of: 2022

 

FRAX%s: The graph provided illustrates a 13.7% chance for a major osteoporotic fx and a 3.2% chance f
or the hips probability for fx in 10 years time.

 

 

 

 

IMPRESSION:

Osteopenia (T Score between -2.5 and -1).

 

There is slightly increased risk of fracture and the patient may be considered 

for treatment. 

 

Re-Screen 2-5 years.

 

NOTE:  T-SCORE=SD OF THE YOUNG ADULT MEAN.

## 2024-06-21 ENCOUNTER — HOSPITAL ENCOUNTER (OUTPATIENT)
Dept: HOSPITAL 47 - RADMAMWWP | Age: 69
Discharge: HOME | End: 2024-06-21
Attending: OBSTETRICS & GYNECOLOGY
Payer: MEDICARE

## 2024-06-21 DIAGNOSIS — Z78.0: ICD-10-CM

## 2024-06-21 DIAGNOSIS — R92.322: Primary | ICD-10-CM

## 2024-06-21 DIAGNOSIS — R92.8: ICD-10-CM

## 2024-06-21 PROCEDURE — 77065 DX MAMMO INCL CAD UNI: CPT

## 2024-06-21 PROCEDURE — 77061 BREAST TOMOSYNTHESIS UNI: CPT

## 2024-06-21 NOTE — MM
Reason for Exam: Additional evaluation requested from abnormal screening. 

Last screening mammogram was performed less than 1 month ago.





Patient History: 

Menarche at age 12. First Full-Term Pregnancy at age 27. Postmenopausal. Patient has history of

breast feeding. Hormonal Contraceptives for 12 years from age 15 until age 30. 2004, Cyst Aspiration

on the Left side. 





Risk Values: 

Monika 5 year model risk: 1.9%.

NCI Lifetime model risk: 5.9%.





Prior Study Comparison: 

5/31/2022 Bilateral MG 3D screening mammo w/cad, [facility]. 6/6/2023 Bilateral MG 3D screening mammo

w/cad, [facility]. 6/18/2024 Bilateral MG 3D screening mammo w/cad, [facility]. 





Tissue Density: 

Left: There are scattered areas of fibroglandular density.





Findings: 

Analyzed By CAD. 

The pattern is symmetrical.

Impression no persistent suspicious density is identified. Mediolateral view appears unremarkable.

No suspicious groups of microcalcifications, spiculated or lobular masses, architectural distortion

or other secondary signs of malignancy are mammographically apparent. 





Overall Assessment: Probably benign, BI-RAD 3





Management: 

Diagnostic Mammogram of the left breast in 6 months.

A negative mammogram report should not preclude additional follow up of suspicious palpable

abnormalities.

Patient should continue monthly self breast exam.

A clinical breast exam by your physician is recommended on an annual basis and results should be

correlated with mammographic findings.



Note on Monika scores and lifetime risk:

1. A Monika score greater than 3% is considered moderate risk. If this is the case, consider

specialist referral to assess eligibility for a risk reducing agent.

2. If overall lifetime risk for the development of breast cancer is 20% or higher, the patient may

qualify for future screening with alternating mammogram and breast MRI.



Electronically signed and approved by: Rod Aguilar D.O. Radiologis

## 2024-06-25 NOTE — P.PN
Progress Note - Text


Progress Note Date: 06/25/24





OUTPATIENT FOLLOW-UP NOTE





TEST(S)/RESULTS: Test results from 6/18/2024 include mammogram that requires a 

left diagnostic mammogram and a bone density test showing osteopenia.  The left 

diagnostic mammogram was done on 6/21/2024 and was probably benign.  A 6-month 

diagnostic left mammogram was recommended.


METHOD OF NOTIFICATION: The patient was notified by phone on 6/25/2024.


PATIENT COMMENTS: The patient states she already received the verbal results 

from her left breast workup on 6/21/2024.  She prefers not to start medication 

for her osteopenia.


DIAGNOSIS: Probably benign mammogram with left breast workup.  Osteopenia.


DISCUSSION: We have discussed how she has lost bone density over the past 2 bone

density test from 2020.  I have offered her prescription medication for 

weakening bones, but she prefers to go without medication at this time.  I have 

stressed the importance of adequate calcium, vitamin D, and regular exercise.  

We will plan on repeating the bone density test in 2 years.  She understands 

that if there is any more decline with the hip measurements, that she will p

robably be in the osteoporosis range at that time


PLAN: She was advised to return in one year for her annual well woman exam.  As 

above.

## 2024-12-04 ENCOUNTER — HOSPITAL ENCOUNTER (OUTPATIENT)
Dept: HOSPITAL 47 - RADNMMAIN | Age: 69
Discharge: HOME | End: 2024-12-04
Attending: INTERNAL MEDICINE
Payer: MEDICARE

## 2024-12-04 DIAGNOSIS — I20.9: Primary | ICD-10-CM

## 2024-12-04 DIAGNOSIS — R06.02: ICD-10-CM

## 2024-12-04 DIAGNOSIS — R94.31: ICD-10-CM

## 2024-12-04 PROCEDURE — 93351 STRESS TTE COMPLETE: CPT

## 2024-12-04 NOTE — CA
Stress Echo 

 Report 

 

 Faustina Cooley 

 

 Age:    69     Gender:    F 

 

 :    1955 

 

 Exam Date:     2024 10:23 

 

 Exam Location: [facility] 

 

 Ht (in):            Wt (lb): 

 

 Ordering Physician:        Misty Palacios MD 

 

 Referring Physician:       MISTY PALACIOS,, 

 

 Technician:                Winsome Flynn RDCS 

 Technologist 

 MRN:    [id number] 

 

 Procedure CPT: 

 

 Indication:        I20.9 Angina 

 

 ICD-9 Codes: 

 

 Rhythm: 

 

 Patient History:                      Shortness of breath and family  

                                       history of heart disease. 

 

 Cardiac Medications: 

 

 Medications in past 24 hours: 

 

 Contrast: 

 

 Stress Results 

 

 Protocol:     Guilherme 

 

 Total dose(mL): 

 

 Exercise Duration (min:sec):          7:10 

 Max ST Depression (mm): 

 Angina Score: 

 Schmidt Score: 

 METS:    8.5 

 

 Resting HR:      69        Resting BP:     110    /   70 

 

 Peak HR:     156       Peak BP:     184   /   87 

 

 Max Predicted HR:       150 

 

 104    % Max Predicted HR 

 

 Target HR:     128 

 

 Double Product:       [id number] 

 

 Stress Summary: 

 

 BP Response: 

 

 Reason for Termination:        Maximal effort/unable to continue 

 

 Cardiac Symptoms:              No symptoms 

 

 ECG Analysis 

 

 Resting ECG: 

 

 Stress ECG: 

 

 Arrhythmia: 

 Echo Analysis 

 

 Resting Echo: 

 

 Peak Echo Analysis: 

 

 MEASUREMENTS (Male/Female) Normal Values 

 

 

 

 

 CONCLUSIONS 

 Patient underwent exercise stress echo with a Guilherme protocol  

 treadmill stress test.  Patient exercised into Stage 3 for a  

 total of 7 minutes and 10 seconds reaching a total of 8.5 METS.   

 Patient's maximum heart rate was 156 which represented 104% age- 

 predicted maximum heart rate. 

 

 Stress EKG portion: 

 At baseline patient's EKG showed normal sinus rhythm, normal  

 axis, T-wave inversion in lead 3 aVF, V3 and V4.  At peak  

 exercise, EKG showed mild exaggeration of baseline EKG  

 abnormalities. 

 

 Stress echo portion: 

 2-D echocardiogram was performed in the parasternal long,  

 personal short, apical 2 and apical four-chamber views at rest,  

 peak exercise and in recovery.  At baseline, echocardiogram  

 showed left ventricular ejection fraction 55% without wall  

 motion abnormalities.  With peak exercise, echocardiogram shows  

 improvement in left ventricular ejection fraction, increase  

 contractility, decrease in left ventricular end systolic  

 dimension without wall motion abnormalities consistent with a  

 normal response to exercise. 

 

 Conclusions: 

 1.  Nonspecific stress EKG portion second baseline EKG  

 abnormalities 

 2.  Normal echo response to exercise without evidence of  

 inducible ischemia. 

 3.  Good exercise capacity. 

 

 Dr. Dain Castillo DO 

 (Electronically Signed) 

 Final Date:      2024 13:03

## 2024-12-23 ENCOUNTER — HOSPITAL ENCOUNTER (OUTPATIENT)
Dept: HOSPITAL 47 - RADMAMWWP | Age: 69
Discharge: HOME | End: 2024-12-23
Attending: OBSTETRICS & GYNECOLOGY
Payer: MEDICARE

## 2024-12-23 DIAGNOSIS — R92.323: ICD-10-CM

## 2024-12-23 DIAGNOSIS — R92.8: Primary | ICD-10-CM

## 2024-12-23 DIAGNOSIS — Z78.0: ICD-10-CM

## 2024-12-23 PROCEDURE — 77065 DX MAMMO INCL CAD UNI: CPT

## 2024-12-23 PROCEDURE — 77061 BREAST TOMOSYNTHESIS UNI: CPT

## 2024-12-23 NOTE — MM
Reason for Exam: Follow-up at short interval from prior study. 

Last screening mammogram was performed 6 month(s) ago.





Patient History: 

Menarche at age 12. First Full-Term Pregnancy at age 27. Postmenopausal. Patient has history of

breast feeding. Hormonal Contraceptives for 12 years from age 15 until age 30. 2004, Cyst Aspiration

on the Left side. 





Risk Values: 

Monika 5 year model risk: 1.9%.

NCI Lifetime model risk: 5.6%.





Prior Study Comparison: 

6/6/2023 Bilateral MG 3D screening mammo w/cad, [facility]. 6/18/2024 Bilateral MG 3D screening mammo

w/cad, [facility]. 6/21/2024 Left MG 3D work up w/cad , [facility]. 





Tissue Density: 

Left: There are scattered areas of fibroglandular density.





Findings: 

Analyzed By CAD. 

Lateral asymmetric density left cc view anterior to middle depth has an appearance unchanged from

older priors, slightly less pronounced compared to the screening 6 months ago. No significant change

from prior exams. 





Overall Assessment: Benign, BI-RAD 2





Management: 

Screening Mammogram of both breasts in 6 months.

Results were given to the patient verbally at the time of exam.



Patient should continue monthly self-breast exams.  A clinical breast exam by your physician is

recommended on an annual basis.

This exam should not preclude additional follow-up of suspicious palpable abnormalities.





Note on Monika scores and lifetime risk:

1. A Monika score greater than 3% is considered moderate risk. If this is the case, consider

specialist referral to assess eligibility for a risk reducing agent.

2. If overall lifetime risk for the development of breast cancer is 20% or higher, the patient may

qualify for future screening with alternating mammogram and breast MRI.







[facility], Workstation: [id number], 12/23/2024 11:10 AM.



Electronically signed and approved by: Mitchel Bai M.D. Radiologist

## 2025-07-08 ENCOUNTER — HOSPITAL ENCOUNTER (OUTPATIENT)
Dept: HOSPITAL 47 - WWCWWP | Age: 70
End: 2025-07-08
Attending: OBSTETRICS & GYNECOLOGY
Payer: MEDICARE

## 2025-07-08 VITALS
TEMPERATURE: 97.8 F | DIASTOLIC BLOOD PRESSURE: 88 MMHG | HEART RATE: 66 BPM | SYSTOLIC BLOOD PRESSURE: 139 MMHG | RESPIRATION RATE: 16 BRPM

## 2025-07-08 DIAGNOSIS — Z87.39: ICD-10-CM

## 2025-07-08 DIAGNOSIS — Z87.891: ICD-10-CM

## 2025-07-08 DIAGNOSIS — Z12.31: ICD-10-CM

## 2025-07-08 DIAGNOSIS — Z01.419: Primary | ICD-10-CM

## 2025-07-08 DIAGNOSIS — N76.0: ICD-10-CM

## 2025-07-08 DIAGNOSIS — N81.6: ICD-10-CM

## 2025-07-08 DIAGNOSIS — L29.2: ICD-10-CM

## 2025-07-08 DIAGNOSIS — Z78.0: ICD-10-CM

## 2025-07-08 DIAGNOSIS — Z88.2: ICD-10-CM

## 2025-07-08 LAB — SPECIMEN SOURCE: (no result)

## 2025-07-08 PROCEDURE — 87480 CANDIDA DNA DIR PROBE: CPT

## 2025-07-08 PROCEDURE — 87510 GARDNER VAG DNA DIR PROBE: CPT

## 2025-07-08 PROCEDURE — 77067 SCR MAMMO BI INCL CAD: CPT

## 2025-07-08 PROCEDURE — 77063 BREAST TOMOSYNTHESIS BI: CPT

## 2025-07-08 PROCEDURE — 87660 TRICHOMONAS VAGIN DIR PROBE: CPT

## 2025-07-09 LAB
G VAGINALIS DNA SPEC QL NAA+PROBE: POSITIVE
T VAGINALIS RRNA SPEC QL NAA+PROBE: NEGATIVE